# Patient Record
Sex: FEMALE | Race: WHITE | NOT HISPANIC OR LATINO | URBAN - METROPOLITAN AREA
[De-identification: names, ages, dates, MRNs, and addresses within clinical notes are randomized per-mention and may not be internally consistent; named-entity substitution may affect disease eponyms.]

---

## 2024-03-25 ENCOUNTER — AMBULATORY SURGICAL CENTER (AMBULATORY)
Dept: URBAN - METROPOLITAN AREA SURGERY 17 | Facility: SURGERY | Age: 49
End: 2024-03-25
Payer: COMMERCIAL

## 2024-03-25 ENCOUNTER — OFFICE (AMBULATORY)
Dept: URBAN - METROPOLITAN AREA PATHOLOGY 4 | Facility: PATHOLOGY | Age: 49
End: 2024-03-25
Payer: COMMERCIAL

## 2024-03-25 VITALS
SYSTOLIC BLOOD PRESSURE: 100 MMHG | HEART RATE: 105 BPM | DIASTOLIC BLOOD PRESSURE: 77 MMHG | OXYGEN SATURATION: 99 % | RESPIRATION RATE: 7 BRPM | DIASTOLIC BLOOD PRESSURE: 93 MMHG | SYSTOLIC BLOOD PRESSURE: 124 MMHG | RESPIRATION RATE: 17 BRPM | SYSTOLIC BLOOD PRESSURE: 122 MMHG | HEART RATE: 125 BPM | DIASTOLIC BLOOD PRESSURE: 52 MMHG | HEART RATE: 97 BPM | DIASTOLIC BLOOD PRESSURE: 75 MMHG | HEART RATE: 111 BPM | OXYGEN SATURATION: 96 % | DIASTOLIC BLOOD PRESSURE: 65 MMHG | HEART RATE: 78 BPM | SYSTOLIC BLOOD PRESSURE: 134 MMHG | SYSTOLIC BLOOD PRESSURE: 95 MMHG | DIASTOLIC BLOOD PRESSURE: 78 MMHG | SYSTOLIC BLOOD PRESSURE: 102 MMHG | TEMPERATURE: 97.3 F | TEMPERATURE: 97.7 F | RESPIRATION RATE: 16 BRPM | HEART RATE: 96 BPM | SYSTOLIC BLOOD PRESSURE: 128 MMHG | OXYGEN SATURATION: 100 % | SYSTOLIC BLOOD PRESSURE: 154 MMHG | DIASTOLIC BLOOD PRESSURE: 72 MMHG | WEIGHT: 175 LBS | OXYGEN SATURATION: 98 % | HEIGHT: 63 IN | OXYGEN SATURATION: 94 % | HEART RATE: 115 BPM | SYSTOLIC BLOOD PRESSURE: 112 MMHG | DIASTOLIC BLOOD PRESSURE: 80 MMHG | RESPIRATION RATE: 8 BRPM | DIASTOLIC BLOOD PRESSURE: 76 MMHG | SYSTOLIC BLOOD PRESSURE: 132 MMHG | RESPIRATION RATE: 19 BRPM | HEART RATE: 98 BPM | SYSTOLIC BLOOD PRESSURE: 123 MMHG | HEART RATE: 102 BPM | HEART RATE: 95 BPM

## 2024-03-25 DIAGNOSIS — R19.5 OTHER FECAL ABNORMALITIES: ICD-10-CM

## 2024-03-25 DIAGNOSIS — D12.2 BENIGN NEOPLASM OF ASCENDING COLON: ICD-10-CM

## 2024-03-25 DIAGNOSIS — K62.1 RECTAL POLYP: ICD-10-CM

## 2024-03-25 DIAGNOSIS — K64.0 FIRST DEGREE HEMORRHOIDS: ICD-10-CM

## 2024-03-25 DIAGNOSIS — K63.5 POLYP OF COLON: ICD-10-CM

## 2024-03-25 DIAGNOSIS — Z12.11 ENCOUNTER FOR SCREENING FOR MALIGNANT NEOPLASM OF COLON: ICD-10-CM

## 2024-03-25 DIAGNOSIS — K57.30 DIVERTICULOSIS OF LARGE INTESTINE WITHOUT PERFORATION OR ABS: ICD-10-CM

## 2024-03-25 LAB
GI HISTOLOGY: A. ASCENDING COLON/POLYP: (no result)
GI HISTOLOGY: B. DESCENDING COLON/POLYP: (no result)
GI HISTOLOGY: C. TRANSVERSE COLON/POLYP: (no result)
GI HISTOLOGY: PDF REPORT: (no result)
Lab: (no result)

## 2024-03-25 PROCEDURE — 45385 COLONOSCOPY W/LESION REMOVAL: CPT | Mod: 33 | Performed by: INTERNAL MEDICINE

## 2024-03-25 PROCEDURE — 88305 TISSUE EXAM BY PATHOLOGIST: CPT | Performed by: PATHOLOGY

## 2024-03-25 NOTE — SERVICENOTES
The right colon was examined twice.
 The patient was informed that this exam is not 100% accurate, depending on many factors (such as the preparation). Small lesions can be missed. Please call if symptoms persist or new symptoms develop.

## 2024-03-25 NOTE — SERVICEHPINOTES
Ms. Nhi Trejo is a 47 YO F with no significant PMH or PSH history presents for colonoscopy due to positive cologuard
br
br Patient denies family history of GI malignancy
br
br This is index colonoscopy. She denies nausea, vomiting or abd pain. No melena or hematochezia. NO unintended weight loss. 
fariba
br Spoke to patient using brother who serve as an .